# Patient Record
Sex: FEMALE | Race: WHITE
[De-identification: names, ages, dates, MRNs, and addresses within clinical notes are randomized per-mention and may not be internally consistent; named-entity substitution may affect disease eponyms.]

---

## 2018-07-24 ENCOUNTER — HOSPITAL ENCOUNTER (EMERGENCY)
Dept: HOSPITAL 47 - EC | Age: 48
Discharge: HOME | End: 2018-07-24
Payer: COMMERCIAL

## 2018-07-24 VITALS — RESPIRATION RATE: 18 BRPM

## 2018-07-24 VITALS — SYSTOLIC BLOOD PRESSURE: 120 MMHG | TEMPERATURE: 97.7 F | HEART RATE: 92 BPM | DIASTOLIC BLOOD PRESSURE: 63 MMHG

## 2018-07-24 DIAGNOSIS — Z88.5: ICD-10-CM

## 2018-07-24 DIAGNOSIS — G43.909: Primary | ICD-10-CM

## 2018-07-24 DIAGNOSIS — Z79.899: ICD-10-CM

## 2018-07-24 PROCEDURE — 96374 THER/PROPH/DIAG INJ IV PUSH: CPT

## 2018-07-24 PROCEDURE — 96361 HYDRATE IV INFUSION ADD-ON: CPT

## 2018-07-24 PROCEDURE — 70450 CT HEAD/BRAIN W/O DYE: CPT

## 2018-07-24 PROCEDURE — 99284 EMERGENCY DEPT VISIT MOD MDM: CPT

## 2018-07-24 PROCEDURE — 70460 CT HEAD/BRAIN W/DYE: CPT

## 2018-07-24 PROCEDURE — 96375 TX/PRO/DX INJ NEW DRUG ADDON: CPT

## 2018-07-24 NOTE — CT
EXAMINATION TYPE: CT brain w con

 

DATE OF EXAM: 7/24/2018

 

COMPARISON: 7/24/18

 

HISTORY: Migraine

 

CT DLP: 1076 mGycm

Automated exposure control for dose reduction was used.

 

CONTRAST: 

CT scan of the head is performed with IV Contrast, patient injected with 100 mL of Isovue 300.

 

FINDINGS: 

There is no abnormal enhancing mass or midline shift identified.  The ventricles and sulci are within
 normal limits in size.  The globes are intact and the visualized sinuses are clear.

 

IMPRESSION: 

Negative contrast enhanced head CT exam.

## 2018-07-24 NOTE — CT
EXAMINATION TYPE: CT brain wo con

 

DATE OF EXAM: 7/24/2018

 

COMPARISON: None

 

HISTORY: Migraine HA

 

CT DLP: 945.5 mGycm

 

Unenhanced CT of the brain was performed.  

 

The ventricles, basal cisterns and sulci overlying the cerebral convexities demonstrate a normal appe
arance.  

 

There is no evidence for intracranial hemorrhage or sulcal effacement.  

 

No mass effects are seen.  

 

Osseous calvarium is intact.

 

If symptoms persist consider MRI as clinically warranted.  

 

IMPRESSION:

 

1.  No acute intracranial process is seen at this time.

## 2018-07-24 NOTE — ED
Headache HPI





- General


Chief Complaint: Headache


Stated Complaint: Migraine


Time Seen by Provider: 07/24/18 10:46


Source: patient, RN notes reviewed


Mode of arrival: wheelchair


Limitations: no limitations





- History of Present Illness


Initial Comments: 





47-year-old female presented emergency Department chief complaint of leg.  She 

states she's had a constant headache for last 8 days and that she's been 

exhibiting symptoms of dizziness feeling lightheaded numbness and tingling on 

the left side.  She states that she had all the symptoms when she saw her 

neurologist last week and she had an MRI, MRA ordered but states that she has 

not had it completed at this time.  Patient states that the headache is getting 

worse and unalleviated with the nortriptyline that she was started on.  Patient 

states that her neurologist told her she probably has an aneurysm though she 

has had no imaging that supported this.  Patient states she's not had any 

recent CT.  Denies any fall, head trauma.  She states the headache is more 

diffuse in nature she does have nausea vomiting to photosensitivity.  She's 

also had some blurred vision in which she had the symptoms when seen her 

neurologist.  Patient reports no chest pain or shortness breath no fever or 

chills.





- Related Data


 Home Medications











 Medication  Instructions  Recorded  Confirmed


 


Aspirin/Acetaminophen/Caffeine 3 tab PO ONCE PRN 07/24/18 07/24/18





[Excedrin Migraine Caplet]   


 


Estradiol [Estradiol 0.1 MG Patch] 1 patch TRANSDERM MONTERO 07/24/18 07/24/18


 


HYDROcodone/APAP 7.5-325MG [Norco 1 tab PO Q6H PRN 07/24/18 07/24/18





7.5-325]   


 


Nortriptyline [Pamelor] 20 mg PO HS 07/24/18 07/24/18


 


buPROPion HCL [Wellbutrin XL] 300 mg PO DAILY 07/24/18 07/24/18











 Allergies











Allergy/AdvReac Type Severity Reaction Status Date / Time


 


morphine Allergy  Rash/Hives Verified 07/24/18 11:24














Review of Systems


ROS Statement: 


Those systems with pertinent positive or pertinent negative responses have been 

documented in the HPI.





ROS Other: All systems not noted in ROS Statement are negative.





Past Medical History


Additional Past Medical History / Comment(s): Migraines


History of Any Multi-Drug Resistant Organisms: None Reported


Past Surgical History: No Surgical Hx Reported


Past Psychological History: No Psychological Hx Reported


Smoking Status: Never smoker


Past Alcohol Use History: None Reported


Past Drug Use History: None Reported





General Exam


Limitations: no limitations


General appearance: alert, in no apparent distress


Head exam: Present: atraumatic, normocephalic, normal inspection


Eye exam: Present: normal appearance, PERRL, EOMI.  Absent: scleral icterus, 

conjunctival injection, periorbital swelling


ENT exam: Present: normal exam, normal oropharynx, mucous membranes moist, TM's 

normal bilaterally, normal external ear exam


Neck exam: Present: normal inspection, full ROM.  Absent: tenderness, 

meningismus, lymphadenopathy


Respiratory exam: Present: normal lung sounds bilaterally.  Absent: respiratory 

distress, wheezes, rales, rhonchi, stridor


Cardiovascular Exam: Present: regular rate, normal rhythm, normal heart sounds.

  Absent: systolic murmur, diastolic murmur, rubs, gallop, clicks


Neurological exam: Present: alert, oriented X3, CN II-XII intact, reflexes 

normal, other (Finger to nose intact bilaterally).  Absent: motor sensory 

deficit


Skin exam: Present: warm, dry, intact, normal color.  Absent: rash





Course


 Vital Signs











  07/24/18





  10:38


 


Temperature 98.2 F


 


Pulse Rate 89


 


Respiratory 18





Rate 


 


Blood Pressure 132/80


 


O2 Sat by Pulse 99





Oximetry 














Medical Decision Making





- Medical Decision Making





47-year-old female presented Department with chief complaint of headache.  

Patient is concerned that she had an aneurysm because her neurologist told her 

that she most likely does based on her symptoms.  Patient CT and CTA which was 

negative for acute abnormality.  Patient states she does feel improved after IV 

medications she will be discharged at this time with follow-up with neurologist 

return parameters were discussed.  Neuro exam was normal.





Disposition


Clinical Impression: 


 Migraine





Disposition: HOME SELF-CARE


Condition: Stable


Instructions:  Acute Headache (ED)


Additional Instructions: 


Please return to the Emergency Department if symptoms worsen or any other 

concerns.


Is patient prescribed a controlled substance at d/c from ED?: No


Referrals: 


Tony Reynaga MD [Primary Care Provider] - 1-2 days


Time of Disposition: 12:37

## 2022-10-27 ENCOUNTER — HOSPITAL ENCOUNTER (OUTPATIENT)
Dept: HOSPITAL 47 - CATHCVL | Age: 52
Discharge: HOME | End: 2022-10-27
Attending: INTERNAL MEDICINE
Payer: COMMERCIAL

## 2022-10-27 VITALS — DIASTOLIC BLOOD PRESSURE: 51 MMHG | SYSTOLIC BLOOD PRESSURE: 119 MMHG | HEART RATE: 72 BPM

## 2022-10-27 VITALS — TEMPERATURE: 98.3 F | RESPIRATION RATE: 16 BRPM

## 2022-10-27 VITALS — BODY MASS INDEX: 38.4 KG/M2

## 2022-10-27 DIAGNOSIS — I25.10: Primary | ICD-10-CM

## 2022-10-27 PROCEDURE — 93312 ECHO TRANSESOPHAGEAL: CPT

## 2022-10-27 PROCEDURE — 93458 L HRT ARTERY/VENTRICLE ANGIO: CPT

## 2022-10-27 PROCEDURE — 93320 DOPPLER ECHO COMPLETE: CPT

## 2022-10-27 PROCEDURE — 93325 DOPPLER ECHO COLOR FLOW MAPG: CPT

## 2022-10-27 RX ADMIN — BENZOCAINE ONE SPRAY: 200 SPRAY DENTAL; ORAL; PERIODONTAL at 09:43

## 2022-10-27 RX ADMIN — BENZOCAINE ONE SPRAY: 200 SPRAY DENTAL; ORAL; PERIODONTAL at 09:38

## 2022-10-27 RX ADMIN — FENTANYL CITRATE ONE MCG: 50 INJECTION, SOLUTION INTRAMUSCULAR; INTRAVENOUS at 09:43

## 2022-10-27 RX ADMIN — FENTANYL CITRATE ONE MCG: 50 INJECTION, SOLUTION INTRAMUSCULAR; INTRAVENOUS at 09:45

## 2022-10-27 NOTE — P.PCN
Date of Procedure: 10/27/22


Operative Findings: 








TRANSESOPHAGEAL ECHOCARDIOGRAM





:


HAIR MAGANA MD, RPVI





INDICATION:


Rule out patent foramen ovale





SEDATION: 


Conscious sedation





COMPLICATION: 


None





LEVEL OF SEDATION


Moderate with sedation length of 12 minutes





PROCEDURE DESCRIPTION:


After obtaining an informed consent, the patient was brought to transesophageal 

echocardiogram room.  Pulse oximetry and heart monitors were attached to the 

patient.  





The patient throat was sprayed using lidocaine.  The patient was turned into 

left lateral position.  After that a bite guard was placed.  After an 

appropriate conscious sedation was initiated, the transesophageal echocardiogram

was advanced through a bite guard into the mid esophagus.  





A 2-D echocardiogram images, color Doppler images, continuous wave images, 

pulse-wave images, of various cardiac structure were performed.  After that the 

transesophageal echocardiogram probe was advanced into the stomach and fixed to 

obtain transgastric view was.  The probe was brought into the mid esophagus.  

Inter-atrial septum was interrogated using 2D images, color Doppler images, and 

then contrast study.  After that transesophageal echocardiogram was withdrawn 

out and upon withdrawing the descending thoracic aorta all the way up to the 

arch was evaluated.





FINDING:


The left ventricular dimension and systolic function appeared to be within 

normal limits.  Ejection fraction appears to be in the range of 50-55%.  The 

right ventricle appeared to be of normal size and function.  The left atrium and

right atrium appeared to be within normal limits for dimension.  The left atrial

appendage appeared to be free from any thrombus.  The interatrial septum 

appeared to be intact.  The aortic valve is trileaflet valve without stenosis or

regurgitation.  Mitral valve seems to be normal with mild MR.  Normal tricuspid 

valve and pulmonic valve seen.  No evidence of pericardial effusion





CONCLUSION:


1.  Intact interatrial septum with no evidence of PFO or ASD.  Intact left 

atrial abdomen


2.  Normal left ventricular dimension and systolic function


3.  Normal right ventricular dimension and systolic function


4.  Normal intracardiac valves


5.  No evidence of pericardial effusion

## 2022-10-27 NOTE — P.PCN
Date of Procedure: 10/27/22


Operative Findings: 








CARDIAC CATHETERIZATION





PERFORMING PHYSICIAN: 


Augustine Tapia MD, RPVI





PROCEDURE PERFORMED:


1.  Selective right and left coronary angiogram


2.  Left heart catheterization





INDICATION:


This is a 52-year-old female patient was experiencing symptoms of chest 

discomfort which she underwent myocardial perfusion imaging stress test and that

revealed moderate area of ischemia.  In the light of.heart catheterization 

wasn't





COMPLICATION:


None





APPROACH:


Right radial artery





LEVEL OF SEDATION:


Moderate with a sedation length of 22 minutes





PROCEDURE DESCRIPTION:


After obtaining an informed consent, the patient was brought to cardiac cath 

lab.  Local anesthesia was performed using lidocaine subcutaneously.  The right 

radial artery was cannulated using Seldinger technique, the guidewire passed 

easily, following that we advanced a 5-Romanian sheath dilator assembly, the wire 

and dilator were removed and sheath was flushed.  





Following that, 2 mg of verapamil along with 5000 unit heparin were given.





Selective right and left coronary angiogram using a 6-Romanian JR4 and JL 3.5 

catheters.  





Following that we did left heart catheterization using 6-Romanian pigtail 

catheter.  





The procedure was completed there was no complication.





SELECTIVE CORONARY ANGIOGRAM:


The right coronary artery:


Large caliber vessel and a dominant vessel.  The ostial RCA has mild disease 

only.  The distal RCA are angiographically normal.  The RCA distally bifurcates 

into PDA and PLV branches both appeared to be angiographically normal





Left main:


Is angiographically normal.  Bifurcates into LCx and LAD





The left circumflex:


Large caliber vessel and nondominant vessel.  The LCx is angiographically 

normal.  Gives rise into the first obtuse marginal branch which were as ramus 

intermedius and appears to be angiographically normal ON to appeared to be also 

angiographically normal and large caliber vessel





The left anterior descending artery:


Is a large caliber vessel.  Its angiographically normal.  Gives rise into the 

diagonal branch which appeared to be angiographically normal





HEMODYNAMICS:


And the LVEDP was 16 mmHg with no significant gradient across aortic





CONCLUSION:


1.  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


2.  Mildly elevated left-sided filling pressure





POSTPROCEDURE MANAGEMENT:


Medical treatment and follow-up with the patient

## 2025-07-02 ENCOUNTER — HOSPITAL ENCOUNTER (OUTPATIENT)
Dept: HOSPITAL 47 - ORWHC2ENDO | Age: 55
End: 2025-07-02
Attending: INTERNAL MEDICINE
Payer: COMMERCIAL

## 2025-07-02 VITALS — HEART RATE: 76 BPM | DIASTOLIC BLOOD PRESSURE: 65 MMHG | SYSTOLIC BLOOD PRESSURE: 123 MMHG

## 2025-07-02 VITALS — TEMPERATURE: 97.4 F

## 2025-07-02 VITALS — BODY MASS INDEX: 38 KG/M2

## 2025-07-02 VITALS — RESPIRATION RATE: 16 BRPM

## 2025-07-02 DIAGNOSIS — K29.50: Primary | ICD-10-CM

## 2025-07-02 DIAGNOSIS — M54.2: ICD-10-CM

## 2025-07-02 DIAGNOSIS — G47.33: ICD-10-CM

## 2025-07-02 DIAGNOSIS — Z79.82: ICD-10-CM

## 2025-07-02 DIAGNOSIS — Z79.899: ICD-10-CM

## 2025-07-02 DIAGNOSIS — Z88.5: ICD-10-CM

## 2025-07-02 DIAGNOSIS — Z86.79: ICD-10-CM

## 2025-07-02 DIAGNOSIS — K44.9: ICD-10-CM

## 2025-07-02 DIAGNOSIS — D72.820: ICD-10-CM

## 2025-07-02 DIAGNOSIS — Z79.02: ICD-10-CM

## 2025-07-02 DIAGNOSIS — G43.909: ICD-10-CM

## 2025-07-02 DIAGNOSIS — Z90.89: ICD-10-CM

## 2025-07-02 DIAGNOSIS — D50.9: ICD-10-CM

## 2025-07-02 DIAGNOSIS — R73.03: ICD-10-CM

## 2025-07-02 LAB
GLIADIN IGA SER-ACNC: 43.3 U/ML
GLIADIN IGG SER IA-ACNC: >250 U/ML
GLIADIN PEPTIDE IGA SER-ACNC: POSITIVE
GLIADIN PEPTIDE IGG SER-ACNC: POSITIVE
GLUCOSE BLD-MCNC: 112 MG/DL (ref 70–110)

## 2025-07-02 PROCEDURE — 83516 IMMUNOASSAY NONANTIBODY: CPT

## 2025-07-02 PROCEDURE — 43239 EGD BIOPSY SINGLE/MULTIPLE: CPT

## 2025-07-02 PROCEDURE — 45378 DIAGNOSTIC COLONOSCOPY: CPT

## 2025-07-02 PROCEDURE — 88305 TISSUE EXAM BY PATHOLOGIST: CPT

## 2025-07-02 RX ADMIN — POTASSIUM CHLORIDE ONE MLS: 14.9 INJECTION, SOLUTION INTRAVENOUS at 06:59

## 2025-07-02 RX ADMIN — POTASSIUM CHLORIDE SCH MLS/HR: 14.9 INJECTION, SOLUTION INTRAVENOUS at 06:59
